# Patient Record
Sex: FEMALE | Race: BLACK OR AFRICAN AMERICAN | NOT HISPANIC OR LATINO | URBAN - METROPOLITAN AREA
[De-identification: names, ages, dates, MRNs, and addresses within clinical notes are randomized per-mention and may not be internally consistent; named-entity substitution may affect disease eponyms.]

---

## 2017-02-08 ENCOUNTER — OFFICE VISIT (OUTPATIENT)
Dept: RETAIL CLINIC | Facility: CLINIC | Age: 27
End: 2017-02-08

## 2017-02-08 VITALS
WEIGHT: 287 LBS | TEMPERATURE: 97.8 F | HEIGHT: 62 IN | DIASTOLIC BLOOD PRESSURE: 85 MMHG | BODY MASS INDEX: 52.81 KG/M2 | SYSTOLIC BLOOD PRESSURE: 135 MMHG

## 2017-02-08 DIAGNOSIS — S46.812A SUBSCAPULARIS (MUSCLE) SPRAIN, LEFT, INITIAL ENCOUNTER: ICD-10-CM

## 2017-02-08 DIAGNOSIS — T23.221A SECOND DEGREE BURN OF FINGER OF RIGHT HAND, INITIAL ENCOUNTER: Primary | ICD-10-CM

## 2017-02-08 PROCEDURE — 99213 OFFICE O/P EST LOW 20 MIN: CPT | Performed by: NURSE PRACTITIONER

## 2017-02-08 RX ORDER — IBUPROFEN 600 MG/1
600 TABLET ORAL EVERY 6 HOURS PRN
Qty: 15 TABLET | Refills: 0 | Status: SHIPPED | OUTPATIENT
Start: 2017-02-08 | End: 2017-02-13

## 2017-02-08 RX ORDER — CYCLOBENZAPRINE HCL 5 MG
5 TABLET ORAL 3 TIMES DAILY PRN
Qty: 15 TABLET | Refills: 0 | Status: SHIPPED | OUTPATIENT
Start: 2017-02-08 | End: 2017-02-13

## 2017-02-08 NOTE — PROGRESS NOTES
Subjective   Lidia Lorenzana is a 26 y.o. female who presents with the following:    Burn   The incident occurred 2 days ago. The burns occurred in the kitchen. The burns occurred while cooking. The burns were a result of contact with a hot liquid. The burns are located on the right fingers (right ring finger). The pain is mild. She has tried NSAIDs and acetaminophen for the symptoms. The treatment provided mild relief.   Muscle Pain   This is a new problem. The current episode started yesterday. The problem occurs constantly. The problem is unchanged. Associated with: when picking up something off of the ground. The pain is present in the left shoulder. The pain is medium. The symptoms are aggravated by any movement. Past treatments include acetaminophen and OTC NSAID. The treatment provided mild relief. There is no swelling present.       The following portions of the patient's history were reviewed and updated as appropriate: allergies, current medications, past family history, past medical history, past social history, past surgical history and problem list.    Review of Systems   Constitutional: Negative.    HENT: Negative.    Eyes: Negative.    Respiratory: Negative.    Cardiovascular: Negative.    Gastrointestinal: Negative.    Musculoskeletal: Positive for neck pain.        Pain in the left shoulder blade with any movement     Skin: Negative.    Neurological: Negative.    Psychiatric/Behavioral: Negative.        Objective   Physical Exam   Constitutional: She is oriented to person, place, and time. She appears well-developed and well-nourished.   Neck: Normal range of motion.   Cardiovascular: Normal rate, regular rhythm and normal heart sounds.    Pulmonary/Chest: Effort normal and breath sounds normal.   Musculoskeletal: She exhibits tenderness (able to manipulate pain with palpation to the shoulder blade). She exhibits no edema or deformity.   Neurological: She is alert and oriented to person, place,  and time.   Skin: Burn noted.        Second degree burn of the ring finger of the right hand with initial blistering. Skin now exposed.    Vitals reviewed.    Vitals:    02/08/17 1613   BP: 135/85   Temp: 97.8 °F (36.6 °C)         Assessment/Plan   Lidia was seen today for burn and shoulder pain.    Diagnoses and all orders for this visit:    Burn due to contact with hot substance    Subscapularis (muscle) sprain, left, initial encounter  -     ibuprofen (ADVIL,MOTRIN) 600 MG tablet; Take 1 tablet by mouth Every 6 (Six) Hours As Needed for mild pain (1-3) or moderate pain (4-6) for up to 5 days.  -     cyclobenzaprine (FLEXERIL) 5 MG tablet; Take 1 tablet by mouth 3 (Three) Times a Day As Needed for muscle spasms for up to 5 days.       Naga/Adaptic/Dry dressing to the right ringer burn daily and prn as needed.  Once scab forms- may apply benadryl cream prn for itching and leave dressings off.    Reviewed home care instructions, and patient verbalized understanding. Patient instructed to follow up with PCP and/or ED for worsening or non-resolving symptoms.     VIVIENNE Lopez

## 2017-02-08 NOTE — PATIENT INSTRUCTIONS
Muscle Strain  A muscle strain is an injury that occurs when a muscle is stretched beyond its normal length. Usually a small number of muscle fibers are torn when this happens. Muscle strain is rated in degrees. First-degree strains have the least amount of muscle fiber tearing and pain. Second-degree and third-degree strains have increasingly more tearing and pain.   Usually, recovery from muscle strain takes 1-2 weeks. Complete healing takes 5-6 weeks.   CAUSES   Muscle strain happens when a sudden, violent force placed on a muscle stretches it too far. This may occur with lifting, sports, or a fall.   RISK FACTORS  Muscle strain is especially common in athletes.   SIGNS AND SYMPTOMS  At the site of the muscle strain, there may be:  · Pain.  · Bruising.  · Swelling.  · Difficulty using the muscle due to pain or lack of normal function.  DIAGNOSIS   Your health care provider will perform a physical exam and ask about your medical history.  TREATMENT   Often, the best treatment for a muscle strain is resting, icing, and applying cold compresses to the injured area.    HOME CARE INSTRUCTIONS   · Use the PRICE method of treatment to promote muscle healing during the first 2-3 days after your injury. The PRICE method involves:    Protecting the muscle from being injured again.    Restricting your activity and resting the injured body part.    Icing your injury. To do this, put ice in a plastic bag. Place a towel between your skin and the bag. Then, apply the ice and leave it on from 15-20 minutes each hour. After the third day, switch to moist heat packs.    Apply compression to the injured area with a splint or elastic bandage. Be careful not to wrap it too tightly. This may interfere with blood circulation or increase swelling.    Elevate the injured body part above the level of your heart as often as you can.  · Only take over-the-counter or prescription medicines for pain, discomfort, or fever as directed by your  health care provider.  · Warming up prior to exercise helps to prevent future muscle strains.  SEEK MEDICAL CARE IF:   · You have increasing pain or swelling in the injured area.  · You have numbness, tingling, or a significant loss of strength in the injured area.  MAKE SURE YOU:   · Understand these instructions.  · Will watch your condition.  · Will get help right away if you are not doing well or get worse.     This information is not intended to replace advice given to you by your health care provider. Make sure you discuss any questions you have with your health care provider.     Document Released: 12/18/2006 Document Revised: 10/08/2014 Document Reviewed: 07/17/2014  [employer] Interactive Patient Education ©2016 [employer]  Burn Care  Your skin is a natural barrier to infection. It is the largest organ of your body. Burns damage this natural protection. To help prevent infection, it is very important to follow your caregiver's instructions in the care of your burn.  Burns are classified as:  · First degree. There is only redness of the skin (erythema). No scarring is expected.  · Second degree. There is blistering of the skin. Scarring may occur with deeper burns.  · Third degree. All layers of the skin are injured, and scarring is expected.  HOME CARE INSTRUCTIONS   · Wash your hands well before changing your bandage.  · Change your bandage as often as directed by your caregiver.    Remove the old bandage. If the bandage sticks, you may soak it off with cool, clean water.    Cleanse the burn thoroughly but gently with mild soap and water.    Pat the area dry with a clean, dry cloth.    Apply a thin layer of antibacterial cream to the burn.    Apply a clean bandage as instructed by your caregiver.    Keep the bandage as clean and dry as possible.  · Elevate the affected area for the first 24 hours, then as instructed by your caregiver.  · Only take over-the-counter or prescription medicines for pain,  discomfort, or fever as directed by your caregiver.  SEEK IMMEDIATE MEDICAL CARE IF:   · You develop excessive pain.  · You develop redness, tenderness, swelling, or red streaks near the burn.  · The burned area develops yellowish-white fluid (pus) or a bad smell.  · You have a fever.  MAKE SURE YOU:   · Understand these instructions.  · Will watch your condition.  · Will get help right away if you are not doing well or get worse.     This information is not intended to replace advice given to you by your health care provider. Make sure you discuss any questions you have with your health care provider.     Document Released: 12/18/2006 Document Revised: 03/11/2013 Document Reviewed: 05/09/2012  [employer] Interactive Patient Education ©2016 [employer]

## 2018-02-27 ENCOUNTER — OFFICE VISIT (OUTPATIENT)
Dept: OBSTETRICS AND GYNECOLOGY | Facility: CLINIC | Age: 28
End: 2018-02-27

## 2018-02-27 VITALS
HEIGHT: 62 IN | WEIGHT: 287.4 LBS | BODY MASS INDEX: 52.89 KG/M2 | SYSTOLIC BLOOD PRESSURE: 130 MMHG | DIASTOLIC BLOOD PRESSURE: 84 MMHG

## 2018-02-27 DIAGNOSIS — R10.2 CHRONIC PELVIC PAIN IN FEMALE: Primary | ICD-10-CM

## 2018-02-27 DIAGNOSIS — G89.29 CHRONIC PELVIC PAIN IN FEMALE: Primary | ICD-10-CM

## 2018-02-27 DIAGNOSIS — Z01.411 ENCOUNTER FOR GYNECOLOGICAL EXAMINATION WITH ABNORMAL FINDING: ICD-10-CM

## 2018-02-27 PROCEDURE — 99385 PREV VISIT NEW AGE 18-39: CPT | Performed by: OBSTETRICS & GYNECOLOGY

## 2018-02-27 RX ORDER — RANITIDINE 150 MG/1
150 TABLET ORAL DAILY
COMMUNITY

## 2018-02-27 NOTE — PROGRESS NOTES
Chief Complaint   Patient presents with   • Gynecologic Exam     patient desires Essure removal.       Lidia Lorenzana is a 27 y.o. year old  presenting to be seen for her first annual gynecologic exam with us.  This patient has been managed by Dr. Calhoun.  She has had 4 vaginal deliveries.  He did an Essure procedure on her in 2016.  Since that procedure she has had chronic pelvic pain with intermittent episodes of severe pain.  She has cyclic menses with occasional heavy flow.  She states that in the past that she has been told that she has endometriosis and PCOS.  She has never had laparoscopy.  She is concerned about chronic pain with Essure devices remaining in place.  She has evidence consistent with irritable bowel syndrome.  She denies urinary symptoms.  She states that she has had an abnormal Pap test in the past.    SCREENING TESTS  NO RECENT TESTING available  2012 202 2022 2023 202 [id number]   203 2033   Age                         PAP                         HPV high risk                         Mammogram                         TEA score                         Breast MRI                         Lipids                         Vitamin D                         Colonoscopy                         DEXA  Frax (hip/any)                         Ovarian Screen                             She exercises regularly: no.  She wears her seat belt: yes.  She has concerns about domestic violence: no.  She has noticed changes in height: no    GYN screening history:  · No data available.    No Additional Complaints Reported    The following portions of the patient's history were reviewed and updated as appropriate:vital signs and   She  does not have any pertinent problems on file.  She  has a past surgical history that includes Essure tubal ligation (2016).  Her family history is not on file.  She  reports that she has never  "smoked. She has never used smokeless tobacco. She reports that she drinks alcohol. She reports that she does not use illicit drugs.  Current Outpatient Prescriptions   Medication Sig Dispense Refill   • raNITIdine (ZANTAC) 150 MG tablet Take 150 mg by mouth Daily.     • Cetirizine HCl (ZYRTEC ALLERGY) 10 MG capsule Take 1 capsule by mouth Daily.     • HYDROCHLOROTHIAZIDE PO Take 12.5 mg by mouth Daily.     • LOSARTAN POTASSIUM PO Take 100 mg by mouth Daily.       No current facility-administered medications for this visit.      She has No Known Allergies..    Review of Systems  A comprehensive review of systems was taken.  Constitutional: negative for fever, chills, activity change, appetite change, fatigue and unexpected weight change.  Respiratory: negative  Cardiovascular: negative  Gastrointestinal: positive for diarrhea and reflux symptoms  Genitourinary:negative  Musculoskeletal:negative  Behavioral/Psych: negative       /84  Ht 157.5 cm (62\")  Wt 130 kg (287 lb 6.4 oz)  LMP 02/19/2018 (Exact Date)  BMI 52.57 kg/m2    Physical Exam    General:  alert; cooperative; well developed; well nourished  obese - Body mass index is 52.57 kg/(m^2).   Skin:  No suspicious lesions seen   Thyroid: normal to inspection and palpation   Lungs:  clear to auscultation bilaterally   Heart:  regular rate and rhythm, S1, S2 normal, no murmur, click, rub or gallop   Breasts:  Examined in supine position  Symmetric without masses or skin dimpling  Nipples normal without inversion, lesions or discharge  There are no palpable axillary nodes   Abdomen: soft, non-tender; no masses  no umbilical or inginual hernias are present  no hepato-splenomegaly  obese   Pelvis: Clinical staff was present for exam  External genitalia:  normal appearance of the external genitalia including Bartholin's and [place]'s glands.  Vaginal:  normal pink mucosa without prolapse or lesions.  Cervix:  normal appearance.  Uterus:  normal size, shape and " consistency. retroverted;  Adnexa:  normal bimanual exam of the adnexa.  Rectal:  anus visually normal appearing. recto-vaginal exam unremarkable and confirms findings;     Lab Review   No data reviewed    Imaging  No data reviewed         ASSESSMENT  Problems Addressed this Visit     None      Visit Diagnoses     Chronic pelvic pain in female    -  Primary    Encounter for gynecological examination with abnormal finding        Relevant Orders    Liquid-based Pap Smear, Screening          PLAN    Medications prescribed this encounter:    New Medications Ordered This Visit   Medications   • raNITIdine (ZANTAC) 150 MG tablet     Sig: Take 150 mg by mouth Daily.   · Pap test done  · Monthly self breast assessment  · I have had a 20 minute face-to-face discussion with this patient about her clinical findings.  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  I have discussed with her experience in dealing with other post-Essure patients.  I have indicated that there is no guarantee that removing her fallopian tubes would alleviate her pain.  I have counseled her that if she has a laparoscopic procedure I would recommend bilateral salpingectomies.  I have counseled her that she has endometriosis ablation could be done at that time.  I have counseled her that removing her fallopian tubes would not affect her menses.  She plans to return to Dr. Calhoun for follow-up.  · Low carbohydrate diet and regular weight-bearing exercise  · Follow up: PRN  *Please note that portions of this documentation may have been completed with a voice recognition program.  Efforts were made to edit this dictation, but occasional words may have been mistranscribed.       This note was electronically signed.    BEN Ortega MD  February 27, 2018  11:44 AM

## 2022-05-12 PROBLEM — Z01.419 WELL WOMAN EXAM: Status: ACTIVE | Noted: 2022-05-12
